# Patient Record
Sex: FEMALE | Race: WHITE
[De-identification: names, ages, dates, MRNs, and addresses within clinical notes are randomized per-mention and may not be internally consistent; named-entity substitution may affect disease eponyms.]

---

## 2020-02-16 ENCOUNTER — HOSPITAL ENCOUNTER (EMERGENCY)
Dept: HOSPITAL 39 - ER | Age: 55
Discharge: HOME | End: 2020-02-16
Payer: SELF-PAY

## 2020-02-16 VITALS — SYSTOLIC BLOOD PRESSURE: 142 MMHG | DIASTOLIC BLOOD PRESSURE: 88 MMHG | OXYGEN SATURATION: 96 %

## 2020-02-16 VITALS — TEMPERATURE: 98 F

## 2020-02-16 DIAGNOSIS — R79.89: ICD-10-CM

## 2020-02-16 DIAGNOSIS — M79.604: Primary | ICD-10-CM

## 2020-02-16 DIAGNOSIS — M79.605: ICD-10-CM

## 2020-02-16 DIAGNOSIS — G31.9: ICD-10-CM

## 2020-02-16 DIAGNOSIS — R68.89: ICD-10-CM

## 2020-02-16 PROCEDURE — 80053 COMPREHEN METABOLIC PANEL: CPT

## 2020-02-16 PROCEDURE — 85025 COMPLETE CBC W/AUTO DIFF WBC: CPT

## 2020-02-16 PROCEDURE — 71275 CT ANGIOGRAPHY CHEST: CPT

## 2020-02-16 PROCEDURE — 71045 X-RAY EXAM CHEST 1 VIEW: CPT

## 2020-02-16 PROCEDURE — 93005 ELECTROCARDIOGRAM TRACING: CPT

## 2020-02-16 PROCEDURE — 36415 COLL VENOUS BLD VENIPUNCTURE: CPT

## 2020-02-16 PROCEDURE — 83605 ASSAY OF LACTIC ACID: CPT

## 2020-02-16 PROCEDURE — 82553 CREATINE MB FRACTION: CPT

## 2020-02-16 PROCEDURE — 83880 ASSAY OF NATRIURETIC PEPTIDE: CPT

## 2020-02-16 PROCEDURE — 82550 ASSAY OF CK (CPK): CPT

## 2020-02-16 PROCEDURE — 84484 ASSAY OF TROPONIN QUANT: CPT

## 2020-02-16 PROCEDURE — 80048 BASIC METABOLIC PNL TOTAL CA: CPT

## 2020-02-16 PROCEDURE — 70450 CT HEAD/BRAIN W/O DYE: CPT

## 2020-02-16 PROCEDURE — 85379 FIBRIN DEGRADATION QUANT: CPT

## 2020-02-16 NOTE — CT
EXAM DESCRIPTION: Head



CLINICAL HISTORY: weakness



COMPARISON: None available



TECHNIQUE: Non contrast cranial CT with multiplanar

reconstructions.



FINDINGS: 



No acute intracranial hemorrhage, transcortical infarct, mass or

mass effect. No intra or extra-axial fluid collection. No focal

edema or midline shift.  The gray-white matter differentiation is

intact. Mild periventricular and deep white matter hypodensities

are nonspecific but likely related to chronic microvascular

ischemic changes. 



Mild prominence of the bilateral lateral ventricles, slightly

greater than expected for the degree of sulcal enlargement. In

addition there is moderate right and mild left atrophy of the

temporal lobes.



No displaced calvarial fracture. Focal mucoperiosteal thickening

within the left posterior maxillary sinus. The remainder of the

visualized paranasal sinuses and the mastoids are clear.





IMPRESSION:



1.  No acute intracranial abnormality.

2.  Moderate bilateral temporal lobes atrophy (more significant

on the right). Findings are greater than expected for age.

3.  Mild prominence of the bilateral lateral ventricles, slightly

greater than expected for the degree of sulcal enlargement,

nonspecific. Normal pressure hydrocephalus can be considered

within the appropriate clinical setting. 





This exam was performed according to our departmental

dose-optimization program, which includes automated exposure

control, adjustment of the mA and/or kV according to patient size

and/or use of iterative reconstruction technique.



Electronically signed by:  Wagner Slaughter DO  2/16/2020 3:28 PM [facility]

Workstation: [id number]

## 2020-02-16 NOTE — CT
EXAM DESCRIPTION: CTA Chest



CLINICAL HISTORY: 55 years, Female, elevated D Dimer 



COMPARISON: None.



TECHNIQUE:

 

Axial images through the chest were performed after the

administration of intravenous contrast using a pulmonary embolus

protocol. MIPS were performed.  This exam was performed according

to our departmental dose-optimization program which includes use

of Automated Exposure Control, adjustment of the mA and/or kV

according to patient size and/or use of iterative reconstruction

technique.



FINDINGS: 



No pulmonary embolus is identified. 

Normal caliber aorta without dissection. 

No pericardial effusion.



No pleural effusion. 

No focal lung consolidation.

No pneumothorax. 

Patent central airway.



Partially visualized fluid collection in the right subcoracoid

space measuring at least 5 x 3.5 cm which contains small pockets

of air. Multiple mildly prominent right and left axillary lymph

nodes.



No acute osseous findings.

No acute abnormality within the visualized upper abdomen.



IMPRESSION: 

Negative for pulmonary embolism.



Partially visualized fluid collection in the right anterior

shoulder extending inferiorly from the subcoracoid space

measuring at least 5 cm in maximal dimension containing small

pockets of air. This may represent a complex joint effusion

although infected fluid collection is not entirely excluded.



Electronically signed by:  Rai Irby DO  2/16/2020 5:19 PM

[facility] Workstation: [id number]

## 2020-02-16 NOTE — ED.PDOC
History of Present Illness





- General


Chief Complaint: General


Time Seen by Provider: 02/16/20 12:50





- History of Present Illness


Initial Comments: 





c/o having intermittent leg pains since many months getting worse since few days

went to the urgent care where she was given gabapentin , pt also c/o whole body 

pain and generalized weakness going of since 2 months , no chest pain or sob . 

she says that pain mostly comes in her joints with some swelling : Currently she

denies for any pain and swelling except for her R hand 


Improving Factors: nothing


Worsening Factors: nothing


Allergies/Adverse Reactions: 


Allergies





NO KNOWN ALLERGY Allergy (Verified 02/16/20 12:58)


   





Home Medications: 


Ambulatory Orders





Naproxen [Naprosyn] 500 mg PO BID #10 tab 02/16/20 











Review of Systems





- Review of Systems


Constitutional: States: weakness


EENTM: States: no symptoms reported


Respiratory: States: no symptoms reported


Cardiology: States: no symptoms reported


Gastrointestinal/Abdominal: States: no symptoms reported


Genitourinary: States: no symptoms reported


Musculoskeletal: States: see HPI


Skin: States: no symptoms reported


Neurological: States: no symptoms reported


Endocrine: States: no symptoms reported


Hematologic/Lymphatic: States: no symptoms reported





Family Medical History





- Family History


  ** Paternal


Family History: Unknown





Physical Exam





- Physical Exam


General Appearance: Alert, Comfortable


Eye Exam: bilateral normal


Ears, Nose, Throat: hearing grossly normal, normal ENT inspection


Neck: non-tender, full range of motion, supple, normal inspection


Respiratory: lungs clear, normal breath sounds, no respiratory distress, no acce

ssory muscle use


Cardiovascular/Chest: regular rate, rhythm


Back Exam: normal inspection, no CVA tenderness, no vertebral tenderness


Extremity: normal range of motion, non-tender, normal inspection, no pedal 

edema, no calf tenderness


Neurologic: no motor/sensory deficits, alert, normal mood/affect, oriented x 3


Skin Exam: normal color, warm/dry


Lymphatic: no adenopathy





Progress





- Progress


Progress: 





02/16/20 17:59


Pt D Dimer is high , no specific source of infection or any clot in the body, no

 pain in calf or thigh , no leg swelling , no abnormal cardiac enzymes , most 

probaly its joint inflammation /auto immune disease , discuss with the pt to 

talk to PCP. 





During the visit saw the pt multiple time answer all the concern of the pt  , 

reviewed labs and X-ray with the pt.


Told the pt that if symptoms gets worse , please come back to ER 


Follow up PCP as soon as possible  


02/18/20 22:37








- Results/Orders


Results/Orders: 





                               Laboratory Results











WBC  14.2 K/mm3 (4.8-10.8)  H  02/16/20  12:50    


 


RBC  4.48 M/mm3 (4.20-5.40)   02/16/20  12:50    


 


Hgb  13.3 gm/dL (12.0-16.0)   02/16/20  12:50    


 


Hct  39.4 % (36.0-47.0)   02/16/20  12:50    


 


MCV  88.0 fl (81.0-99.0)   02/16/20  12:50    


 


MCH  29.8 pg (27.0-31.0)   02/16/20  12:50    


 


MCHC  33.9 g/dL (33.0-37.0)   02/16/20  12:50    


 


RDW  13.0 % (11.5-14.5)   02/16/20  12:50    


 


Plt Count  589 K/mm3 (130-400)  H  02/16/20  12:50    


 


MPV  6.5 fl (7.40-10.4)  L  02/16/20  12:50    


 


Absolute Neuts (auto)  11.00 K/uL (1.8-6.8)  H  02/16/20  12:50    


 


Absolute Lymphs (auto)  1.70 K/uL (1.0-3.4)   02/16/20  12:50    


 


Absolute Monos (auto)  1.20 K/uL (0.2-0.8)  H  02/16/20  12:50    


 


Absolute Eos (auto)  0.20 K/uL (0.0-0.4)   02/16/20  12:50    


 


Absolute Basos (auto)  0.10 K/uL (0.0-0.1)   02/16/20  12:50    


 


Neutrophils %  77.2 % (42.0-78.0)   02/16/20  12:50    


 


Lymphocytes %  12.1 % (20.0-50.0)  L  02/16/20  12:50    


 


Monocytes %  8.3 % (2.0-9.0)   02/16/20  12:50    


 


Eosinophils %  1.7 % (1.0-5.0)   02/16/20  12:50    


 


Basophils %  0.7 % (0.0-2.0)   02/16/20  12:50    


 


D-Dimer, Quantitative  4170 ng/ml (131-400)  H*  02/16/20  14:40    


 


Sodium  131 mmol/L (135-145)  L  02/16/20  18:09    


 


Potassium  4.2 mmol/L (3.6-5.0)   02/16/20  18:09    


 


Chloride  95 mmol/L (101-111)  L  02/16/20  18:09    


 


Carbon Dioxide  24 mmol/L (21-31)   02/16/20  18:09    


 


Anion Gap  16.2  (12-18)   02/16/20  18:09    


 


BUN  9 mg/dL (7-18)   02/16/20  18:09    


 


Creatinine  0.60 mg/dL (0.6-1.3)   02/16/20  18:09    


 


BUN/Creatinine Ratio  15.0  (10-20)   02/16/20  18:09    


 


Random Glucose  113 mg/dL ()  H  02/16/20  18:09    


 


Serum Osmolality  262.2 mOsm/L (275-295)  L  02/16/20  18:09    


 


Lactic Acid  1.2 mmol/L (0.5-2.2)   02/16/20  15:59    


 


Calcium  9.3 mg/dL (8.4-10.2)   02/16/20  18:09    


 


Total Bilirubin  0.5 mg/dL (0.2-1.0)   02/16/20  12:50    


 


AST  37 IU/L (10-42)   02/16/20  12:50    


 


ALT  39 IU/L (10-60)   02/16/20  12:50    


 


Alkaline Phosphatase  69 IU/L ()   02/16/20  12:50    


 


Creatine Kinase  33 IU/L ()   02/16/20  14:40    


 


CK-MB (CK-2)  3.5 ng/mL (0.0-4.4)   02/16/20  14:40    


 


CK-MB (CK-2) %  Not Reportable   02/16/20  14:40    


 


Troponin I  < 0.02 ng/mL (0.01-0.05)   02/16/20  17:27    


 


B-Natriuretic Peptide  54.5 pg/ml (0-100)   02/16/20  14:40    


 


Serum Total Protein  7.3 gm/dL (6.4-8.2)   02/16/20  12:50    


 


Albumin  2.8 g/dl (3.2-5.5)  L  02/16/20  12:50    


 


Globulin  4.5 gm/dL (2.3-3.5)  H  02/16/20  12:50    


 


Albumin/Globulin Ratio  0.6  (1.1-1.9)  L  02/16/20  12:50    














- EKG/XRAY/CT


EKG: Sinus, no ST T wave changes





Departure





- Departure


Clinical Impression: 


 General symptom, Generalized pain, Leg pain, bilateral





Time of Disposition: 17:59


Disposition: Discharge to Home or Self Care


Condition: Good


Departure Forms:  ED Discharge - Pt. Copy, Patient Portal Self Enrollment


Diet: resume usual diet


Activity: increase activity as tolerated, walking as tolerated


Prescriptions: 


Naproxen [Naprosyn] 500 mg PO BID #10 tab


Home Medications: 


Ambulatory Orders





Naproxen [Naprosyn] 500 mg PO BID #10 tab 02/16/20 








Additional Instructions: 


Follow up with PCP in 1-2 days 


If symptoms gets worse please come back to ER

## 2020-02-16 NOTE — RAD
EXAM DESCRIPTION: Chest,1 View



CLINICAL HISTORY: 55 years Female, sob



COMPARISON: 2/14/2020.



TECHNIQUE: AP portable chest.



FINDINGS/IMPRESSION:



The lungs are hypoinflated with mild central pulmonary vascular

congestion. Left basilar subsegmental atelectasis. No focal

consolidation, significant pneumothorax or pleural effusion seen.

The heart is at the upper limits of normal in size. No acute

osseous abnormality.



Electronically signed by:  Wagner Slaughter DO  2/16/2020 3:30 PM [facility]

Workstation: [id number]